# Patient Record
Sex: FEMALE | Race: WHITE | Employment: OTHER | URBAN - METROPOLITAN AREA
[De-identification: names, ages, dates, MRNs, and addresses within clinical notes are randomized per-mention and may not be internally consistent; named-entity substitution may affect disease eponyms.]

---

## 2021-02-15 ENCOUNTER — HOSPITAL ENCOUNTER (EMERGENCY)
Age: 71
Discharge: HOME | End: 2021-02-15
Attending: EMERGENCY MEDICINE | Admitting: EMERGENCY MEDICINE
Payer: MEDICARE

## 2021-02-15 VITALS
WEIGHT: 171.96 LBS | OXYGEN SATURATION: 97 % | HEART RATE: 84 BPM | HEIGHT: 62 IN | SYSTOLIC BLOOD PRESSURE: 124 MMHG | TEMPERATURE: 98.8 F | BODY MASS INDEX: 31.64 KG/M2 | RESPIRATION RATE: 17 BRPM | DIASTOLIC BLOOD PRESSURE: 76 MMHG

## 2021-02-15 DIAGNOSIS — S61.011A THUMB LACERATION, RIGHT, INITIAL ENCOUNTER: Primary | ICD-10-CM

## 2021-02-15 PROCEDURE — 75810000293 HC SIMP/SUPERF WND  RPR

## 2021-02-15 PROCEDURE — 90715 TDAP VACCINE 7 YRS/> IM: CPT | Performed by: PHYSICIAN ASSISTANT

## 2021-02-15 PROCEDURE — 74011250636 HC RX REV CODE- 250/636: Performed by: PHYSICIAN ASSISTANT

## 2021-02-15 PROCEDURE — 99283 EMERGENCY DEPT VISIT LOW MDM: CPT

## 2021-02-15 PROCEDURE — 90471 IMMUNIZATION ADMIN: CPT

## 2021-02-15 PROCEDURE — 74011000250 HC RX REV CODE- 250: Performed by: PHYSICIAN ASSISTANT

## 2021-02-15 RX ORDER — BACITRACIN 500 UNIT/G
1 PACKET (EA) TOPICAL
Status: COMPLETED | OUTPATIENT
Start: 2021-02-15 | End: 2021-02-15

## 2021-02-15 RX ORDER — LIDOCAINE HYDROCHLORIDE 20 MG/ML
10 INJECTION, SOLUTION INFILTRATION; PERINEURAL
Status: COMPLETED | OUTPATIENT
Start: 2021-02-15 | End: 2021-02-15

## 2021-02-15 RX ADMIN — TETANUS TOXOID, REDUCED DIPHTHERIA TOXOID AND ACELLULAR PERTUSSIS VACCINE, ADSORBED 0.5 ML: 5; 2.5; 8; 8; 2.5 SUSPENSION INTRAMUSCULAR at 19:38

## 2021-02-15 RX ADMIN — BACITRACIN 1 PACKET: 500 OINTMENT TOPICAL at 20:00

## 2021-02-15 RX ADMIN — LIDOCAINE HYDROCHLORIDE 200 MG: 20 INJECTION, SOLUTION INFILTRATION; PERINEURAL at 19:38

## 2021-02-15 NOTE — ED TRIAGE NOTES
TRIAGE NOTE: Patient arrived from home with c/o RIGHT thumb laceration. Bleeding controlled in triage. Tetanus up to date.

## 2021-02-16 NOTE — ED PROVIDER NOTES
71 y/o female with no significant pmhx presents to ED due to a laceration to her right thumb which she sustained just prior to arrival. Pt was washing a knife and it slipped in her hand into the R thumb. Able to stop the bleeding with pressure. Denies numbness of her thumb or weakness. Unsure of last tetanus vaccination. History reviewed. No pertinent past medical history. Past Surgical History:   Procedure Laterality Date    HX BREAST REDUCTION      HX CHOLECYSTECTOMY           History reviewed. No pertinent family history.     Social History     Socioeconomic History    Marital status:      Spouse name: Not on file    Number of children: Not on file    Years of education: Not on file    Highest education level: Not on file   Occupational History    Not on file   Social Needs    Financial resource strain: Not on file    Food insecurity     Worry: Not on file     Inability: Not on file    Transportation needs     Medical: Not on file     Non-medical: Not on file   Tobacco Use    Smoking status: Never Smoker    Smokeless tobacco: Never Used   Substance and Sexual Activity    Alcohol use: Never     Frequency: Never    Drug use: Not on file    Sexual activity: Not on file   Lifestyle    Physical activity     Days per week: Not on file     Minutes per session: Not on file    Stress: Not on file   Relationships    Social connections     Talks on phone: Not on file     Gets together: Not on file     Attends Hoahaoism service: Not on file     Active member of club or organization: Not on file     Attends meetings of clubs or organizations: Not on file     Relationship status: Not on file    Intimate partner violence     Fear of current or ex partner: Not on file     Emotionally abused: Not on file     Physically abused: Not on file     Forced sexual activity: Not on file   Other Topics Concern    Not on file   Social History Narrative    Not on file         ALLERGIES: Naproxen    Review of Systems   Respiratory: Negative for cough and shortness of breath. Cardiovascular: Negative for chest pain. Gastrointestinal: Negative for nausea and vomiting. Genitourinary: Negative for dysuria. Musculoskeletal: Negative for myalgias. Skin: Negative for rash. Laceration right thumb   Neurological: Negative for dizziness, weakness and numbness. Vitals:    02/15/21 1759   BP: 124/76   Pulse: 84   Resp: 17   Temp: 98.8 °F (37.1 °C)   SpO2: 97%   Weight: 78 kg (171 lb 15.3 oz)   Height: 5' 2\" (1.575 m)            Physical Exam  Vitals signs and nursing note reviewed. Constitutional:       General: She is not in acute distress. HENT:      Head: Normocephalic. Nose: Nose normal.      Mouth/Throat:      Mouth: Mucous membranes are moist.   Eyes:      Extraocular Movements: Extraocular movements intact. Neck:      Musculoskeletal: Normal range of motion. Pulmonary:      Effort: Pulmonary effort is normal. No respiratory distress. Musculoskeletal:        Hands:       Comments: Capillary refill brisk, normal sensation and strength   Skin:     General: Skin is dry. Findings: No rash. Neurological:      Mental Status: She is alert and oriented to person, place, and time. Psychiatric:         Mood and Affect: Mood normal.          MDM  Number of Diagnoses or Management Options  Thumb laceration, right, initial encounter  Diagnosis management comments: Differential diagnosis includes foreign body, bony involvement, nail injury, neurovascular injury, and others    Thumb is neurovascularly intact. There is no foreign body. Cleaned thoroughly with Betadine and closed with good approximation. There is no nail involvement. Discussed appropriate follow-up with primary care for suture removal. Return precautions reviewed.               Wound Repair    Date/Time: 2/15/2021 8:47 PM  Performed by: PAPreparation: skin prepped with Betadine  Location details: right thumb  Wound length:2.5 cm or less (1.5)  Anesthesia: local infiltration    Anesthesia:  Local Anesthetic: lidocaine 2% without epinephrine  Anesthetic total: 3 mL  Foreign bodies: no foreign bodies  Irrigation solution: saline  Irrigation method: syringe  Debridement: none  Wound skin closure material used: 4-0 ethilon. Number of sutures: 3  Technique: simple  Approximation: close  Dressing: antibiotic ointment  Patient tolerance: patient tolerated the procedure well with no immediate complications  My total time at bedside, performing this procedure was 16-30 minutes (20).             Nancy Mukherjee PA-C  2/15/2021

## 2021-02-16 NOTE — ED NOTES
Bacitracin applied to wound to right thumb. Covered with a non-stick dressing and wrapped with tube gauze. Patient verbalizes understanding of wound care. Denies any questions or concerns at this time.

## 2021-02-16 NOTE — ED NOTES

## 2024-03-11 ENCOUNTER — OFFICE VISIT (OUTPATIENT)
Age: 74
End: 2024-03-11

## 2024-03-11 VITALS
SYSTOLIC BLOOD PRESSURE: 144 MMHG | TEMPERATURE: 97.6 F | BODY MASS INDEX: 30.41 KG/M2 | OXYGEN SATURATION: 98 % | WEIGHT: 171.6 LBS | RESPIRATION RATE: 18 BRPM | DIASTOLIC BLOOD PRESSURE: 79 MMHG | HEART RATE: 77 BPM | HEIGHT: 63 IN

## 2024-03-11 DIAGNOSIS — M79.5 FOREIGN BODY (FB) IN SOFT TISSUE: Primary | ICD-10-CM

## 2024-03-11 RX ORDER — LORATADINE 10 MG/1
10 CAPSULE, LIQUID FILLED ORAL DAILY
COMMUNITY

## 2024-03-11 RX ORDER — CEPHALEXIN 500 MG/1
500 CAPSULE ORAL 3 TIMES DAILY
Qty: 21 CAPSULE | Refills: 0 | Status: SHIPPED | OUTPATIENT
Start: 2024-03-11 | End: 2024-03-18

## 2024-03-11 ASSESSMENT — ENCOUNTER SYMPTOMS: SHORTNESS OF BREATH: 0

## 2024-03-11 NOTE — PATIENT INSTRUCTIONS
You received a digital block to numb your finger. Be very careful you do not hit your finger or expose to heat while it is still numb. Inspect the injection site to ensure it does not turn red. Otherwise, take pain reliever before the lidocaine wears completely off. Monitor for signs and symptoms of infection . I have prescribed Keflex to cover any possible infection.     I have discussed any testing results, diagnosis and treatment plan. If symptoms worsen please present to your local ED for urgent matters. Otherwise, please follow up with your PCP. Thank you for seeing us today at [facility].  I hope you feel better soon.

## 2024-03-11 NOTE — PROGRESS NOTES
down the nail.  Review of Systems    Review of Systems   Constitutional:  Negative for fever.   HENT: Negative.     Respiratory:  Negative for shortness of breath.    Cardiovascular:  Negative for chest pain.        Allergies   Allergen Reactions    Naproxen Hives       No past medical history on file.    Past Surgical History:   Procedure Laterality Date    BREAST REDUCTION SURGERY      CHOLECYSTECTOMY        Objective     Vitals:    03/11/24 1037   BP: (!) 144/79   Site: Right Upper Arm   Position: Sitting   Cuff Size: Medium Adult   Pulse: 77   Resp: 18   Temp: 97.6 °F (36.4 °C)   SpO2: 98%   Weight: 77.8 kg (171 lb 9.6 oz)   Height: 1.6 m (5' 3\")     Physical Exam  Musculoskeletal:      Right hand: Tenderness present. Normal sensation. Normal pulse.      Left hand: Normal.      Comments: Very small piece of wood under nail of right ring finger. Located in mid nail.         No results found for any visits on 03/11/24.  The patients condition was discussed with the patient and they understand.  The patient is to follow up with primary care doctor.  If signs and symptoms become worse the pt is to go to the ER. The patient is to take medications as prescribed.     An electronic signature was used to authenticate this note.  Augusta Frias PA-C

## 2024-03-22 ENCOUNTER — OFFICE VISIT (OUTPATIENT)
Age: 74
End: 2024-03-22

## 2024-03-22 VITALS
OXYGEN SATURATION: 97 % | RESPIRATION RATE: 18 BRPM | DIASTOLIC BLOOD PRESSURE: 71 MMHG | BODY MASS INDEX: 31.1 KG/M2 | WEIGHT: 169 LBS | HEIGHT: 62 IN | SYSTOLIC BLOOD PRESSURE: 128 MMHG | HEART RATE: 73 BPM | TEMPERATURE: 98.6 F

## 2024-03-22 DIAGNOSIS — H01.001 BLEPHARITIS OF RIGHT UPPER EYELID, UNSPECIFIED TYPE: Primary | ICD-10-CM

## 2024-03-22 RX ORDER — ERYTHROMYCIN 5 MG/G
OINTMENT OPHTHALMIC EVERY 6 HOURS
Qty: 1 G | Refills: 0 | Status: SHIPPED | OUTPATIENT
Start: 2024-03-22 | End: 2024-03-29

## 2024-03-22 RX ORDER — CHOLECALCIFEROL (VITAMIN D3) 1250 MCG
50000 CAPSULE ORAL DAILY
COMMUNITY

## 2024-03-22 ASSESSMENT — ENCOUNTER SYMPTOMS
PHOTOPHOBIA: 0
EYE ITCHING: 0
EYE DISCHARGE: 1
EYE REDNESS: 1

## 2024-03-22 NOTE — PATIENT INSTRUCTIONS
Please follow up with your PCP in 2-5 days.    Go to the ER for worsening or persistent symptoms, changes in vision, severe headache with nausea, or pain with eye movement.    Good lid hygiene is the mainstay of treatment for all forms of blepharitis.    -Eliminate or limit potential triggers or exacerbating factors (eg, allergens, cigarette smoking).  Contact lenses may continue to be worn if comfortable.  -The management of contact (allergic) blepharitis consists of eliminating use of the offending agent (eg, cosmetics).  Be vigilant about removing makeup at night, cleaning applicators, and avoiding old or  products.    Mild to moderate symptoms:    -warm compresses, lid massage, lid washing and artificial tears  Put warm wet pressure on your eyes - wet a clean wash cloth with warm (not scalding hot) water and put it over your eyes.  When the wash cloth cools, reheat it with warm water and put it back over your eyes.  Repeat these steps for 5 minutes, 2 to 4 times a day.  Gently rub your eyelids - Do this right after putting warm, wet pressure on your eyes.  Use the washcloth or a clean fingertip to gently rub your eyelid in small circles.  Wash your eyelids - Use plain warm water or warm water with a drop of baby shampoo on a clean washcloth, gauze pad, or cotton swab.  Gently clean any crusty material off the eyelashes and eyelids.  Do not rub hard or you can cause more irritation.  You can also use over-the-counter eyelid scrubs and pads  Refresh Eye Drops, preferably gel for relief.

## 2024-03-22 NOTE — PROGRESS NOTES
Subjective     Chief Complaint   Patient presents with    Eye Pain     right eye lid swollen and sore with clear discharge, got top soil in her eye          Patient ID:  Raquel Hamilton is a 73 y.o. female.    Patient is 73 year old female presenting with swelling and drainage of right eye.  She reports symptoms began yesterday while gardening.  She thinks she may have gotten some topsoil in her eye.  She denies any visual changes or contact lenses.          Review of Systems   Eyes:  Positive for discharge and redness. Negative for photophobia, pain, itching and visual disturbance.       History reviewed. No pertinent past medical history.    Past Surgical History:   Procedure Laterality Date    BREAST REDUCTION SURGERY      CHOLECYSTECTOMY         History reviewed. No pertinent family history.    Allergies   Allergen Reactions    Naproxen Hives       Social History     Tobacco Use    Smoking status: Never    Smokeless tobacco: Never   Substance Use Topics    Alcohol use: Never    Drug use: Never       Objective   Vitals:    03/22/24 0841   BP: 128/71   Pulse: 73   Resp: 18   Temp: 98.6 °F (37 °C)   SpO2: 97%     Physical Exam  Constitutional:       General: She is not in acute distress.     Appearance: Normal appearance. She is not ill-appearing.   HENT:      Head: Normocephalic and atraumatic.   Eyes:      Extraocular Movements: Extraocular movements intact.      Conjunctiva/sclera: Conjunctivae normal.      Pupils: Pupils are equal, round, and reactive to light.      Right eye: No fluorescein uptake.     Cardiovascular:      Rate and Rhythm: Normal rate.      Pulses: Normal pulses.   Pulmonary:      Effort: Pulmonary effort is normal.   Skin:     General: Skin is warm and dry.   Neurological:      Mental Status: She is alert and oriented to person, place, and time.         Assessment & Plan     Diagnoses and all orders for this visit:  Blepharitis of right upper eyelid, unspecified type  Other orders  -

## 2024-05-31 ENCOUNTER — OFFICE VISIT (OUTPATIENT)
Age: 74
End: 2024-05-31

## 2024-05-31 VITALS
BODY MASS INDEX: 31.54 KG/M2 | SYSTOLIC BLOOD PRESSURE: 134 MMHG | RESPIRATION RATE: 18 BRPM | HEIGHT: 62 IN | WEIGHT: 171.4 LBS | DIASTOLIC BLOOD PRESSURE: 76 MMHG | TEMPERATURE: 98.4 F | OXYGEN SATURATION: 97 % | HEART RATE: 64 BPM
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
## 2024-05-31 RX ORDER — TRIAMCINOLONE ACETONIDE 1 MG/G
OINTMENT TOPICAL 2 TIMES DAILY
Qty: 15 G | Refills: 0 | Status: SHIPPED | OUTPATIENT
Start: 2024-05-31 | End: 2024-06-07

## 2024-05-31 ASSESSMENT — ENCOUNTER SYMPTOMS
VOMITING: 0
NAUSEA: 0
SHORTNESS OF BREATH: 0
COUGH: 0

## 2024-05-31 NOTE — PROGRESS NOTES
Subjective     Chief Complaint   Patient presents with    Other     Pt presents an insect bite on left shoulder on Monday, now red, swollen, painful with heat to the touch w/o limitations on moving arm.        HPI 73-year-old female presents for a bug bite on her left shoulder going on for the past 3 to 4 days.  She noticed some localized redness.  She has been trying various treatments including erythromycin topically hydrocortisone topically, poison ivy medicine.  There has been no fever chills nausea vomiting difficulty breathing.    History reviewed. No pertinent past medical history.    Past Surgical History:   Procedure Laterality Date    BREAST REDUCTION SURGERY      CHOLECYSTECTOMY         History reviewed. No pertinent family history.    Allergies   Allergen Reactions    Naproxen Hives       Social History     Tobacco Use    Smoking status: Never    Smokeless tobacco: Never   Substance Use Topics    Alcohol use: Never    Drug use: Never       Vitals:    05/31/24 0903   BP: 134/76   Pulse: 64   Resp: 18   Temp: 98.4 °F (36.9 °C)   SpO2: 97%       Review of Systems   Constitutional:  Negative for chills and fever.   HENT:  Negative for congestion.    Respiratory:  Negative for cough and shortness of breath.    Gastrointestinal:  Negative for nausea and vomiting.       Objective     Physical Exam  Vitals reviewed.   Constitutional:       Appearance: Normal appearance.   Cardiovascular:      Rate and Rhythm: Normal rate and regular rhythm.      Heart sounds: Normal heart sounds.   Pulmonary:      Effort: Pulmonary effort is normal.      Breath sounds: Normal breath sounds.   Skin:     Comments: Left shoulder exam shows a 6 bug bite with some surrounding very faint redness with no red streaking erythema or discharge.  Classic for a simple bug bite with histamine response.   Neurological:      General: No focal deficit present.      Mental Status: She is alert and oriented to person, place, and time.

## 2024-05-31 NOTE — PATIENT INSTRUCTIONS
Patient to use some over-the-counter antihistamine such as Zyrtec and will call in topical steroid.  Follow-up as needed.

## 2024-10-10 ENCOUNTER — OFFICE VISIT (OUTPATIENT)
Age: 74
End: 2024-10-10

## 2024-10-10 VITALS
TEMPERATURE: 98.1 F | WEIGHT: 173.2 LBS | RESPIRATION RATE: 18 BRPM | HEIGHT: 62 IN | SYSTOLIC BLOOD PRESSURE: 135 MMHG | DIASTOLIC BLOOD PRESSURE: 82 MMHG | BODY MASS INDEX: 31.87 KG/M2 | OXYGEN SATURATION: 96 % | HEART RATE: 73 BPM

## 2024-10-10 DIAGNOSIS — R03.0 ELEVATED BLOOD PRESSURE READING: ICD-10-CM
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
## 2024-10-10 DIAGNOSIS — L03.818 CELLULITIS OF OTHER SPECIFIED SITE: Primary | ICD-10-CM

## 2024-10-10 DIAGNOSIS — S30.860A INSECT BITE OF PELVIC REGION, INITIAL ENCOUNTER: ICD-10-CM

## 2024-10-10 RX ORDER — DOXYCYCLINE HYCLATE 100 MG
100 TABLET ORAL 2 TIMES DAILY
Qty: 20 TABLET | Refills: 0 | Status: SHIPPED | OUTPATIENT
Start: 2024-10-10 | End: 2024-10-20

## 2024-10-10 ASSESSMENT — ENCOUNTER SYMPTOMS
RESPIRATORY NEGATIVE: 1
COLOR CHANGE: 1
EYES NEGATIVE: 1
GASTROINTESTINAL NEGATIVE: 1

## 2024-10-10 NOTE — PATIENT INSTRUCTIONS
1 ½ ounces of cheese.  Eat 7 to 8 servings of grains each day. A serving is 1 slice of bread, 1 ounce of dry cereal, or ½ cup of cooked rice, pasta, or cooked cereal. Try to choose whole-grain products as much as possible.  Limit lean meat, poultry, and fish to 6 ounces each day. Six ounces is about the size of two decks of cards.  Eat 4 to 5 servings of nuts, seeds, and legumes (cooked dried beans, lentils, and split peas) each week. A serving is 1/3 cup of nuts, 2 tablespoons of seeds, or ½ cup cooked dried beans or peas.  Limit sweets and added sugars to 5 servings or less a week. A serving is 1 tablespoon jelly or jam, ½ cup sorbet, or 1 cup of lemonade.  Tips for success  Start small. Do not try to make dramatic changes to your diet all at once. You might feel that you are missing out on your favorite foods and then be more likely to not follow the plan. Make small changes, and stick with them. Once those changes become habit, add a few more changes.  Try some of the following:  Make it a goal to eat a fruit or vegetable at every meal and at snacks. This will make it easy to get the recommended amount of fruits and vegetables each day.  Try yogurt topped with fruit and nuts for a snack or healthy dessert.  Add lettuce, tomato, cucumber, and onion to sandwiches.  Combine a ready-made pizza crust with low-fat mozzarella cheese and lots of vegetable toppings. Try using tomatoes, squash, spinach, broccoli, carrots, cauliflower, and onions.  Have a variety of cut-up vegetables with a low-fat dip as an appetizer instead of chips and dip.  Sprinkle sunflower seeds or chopped almonds over salads. Or try adding chopped walnuts or almonds to cooked vegetables.  Try some vegetarian meals using beans and peas. Add garbanzo or kidney beans to salads. Make burritos and tacos with mashed hebert beans or black beans    © 7628-9725 [employer]. Care instructions adapted under license by [facility].

## 2024-10-10 NOTE — PROGRESS NOTES
10/10/24.      Review of Systems   Constitutional: Negative.    HENT: Negative.     Eyes: Negative.    Respiratory: Negative.     Cardiovascular: Negative.    Gastrointestinal: Negative.    Genitourinary: Negative.    Musculoskeletal: Negative.    Skin:  Positive for color change (red area with itching and pain to middle pubic area).   Neurological: Negative.    Hematological: Negative.    Psychiatric/Behavioral: Negative.           Physical Exam  Vitals and nursing note reviewed.   Constitutional:       Appearance: Normal appearance.   HENT:      Head: Normocephalic.   Cardiovascular:      Rate and Rhythm: Normal rate and regular rhythm.      Pulses: Normal pulses.      Heart sounds: Normal heart sounds.   Pulmonary:      Effort: Pulmonary effort is normal.      Breath sounds: Normal breath sounds.   Abdominal:      General: Abdomen is flat. Bowel sounds are normal.      Palpations: Abdomen is soft.   Musculoskeletal:         General: Normal range of motion.   Skin:     Findings: Erythema present.             Comments: An area the size of 6cm x 5cm erythematous and warm to the touch; bite site noted in center of site; no drainage noted   Neurological:      General: No focal deficit present.      Mental Status: She is alert.   Psychiatric:         Mood and Affect: Mood normal.         Behavior: Behavior normal.         Thought Content: Thought content normal.        Vitals:    10/10/24 0855 10/10/24 0924   BP: (!) 143/83 135/82   Site: Right Upper Arm Left Upper Arm   Position: Sitting Sitting   Cuff Size: Medium Adult Medium Adult   Pulse: 73    Resp: 18    Temp: 98.1 °F (36.7 °C)    TempSrc: Oral    SpO2: 96%    Weight: 78.6 kg (173 lb 3.2 oz)    Height: 1.575 m (5' 2\")         Allergies   Allergen Reactions    Naproxen Hives       Current Outpatient Medications   Medication Sig Dispense Refill    doxycycline hyclate (VIBRA-TABS) 100 MG tablet Take 1 tablet by mouth 2 times daily for 10 days 20 tablet 0    vitamin

## 2024-11-03 ENCOUNTER — OFFICE VISIT (OUTPATIENT)
Age: 74
End: 2024-11-03

## 2024-11-03 VITALS
TEMPERATURE: 98.3 F | BODY MASS INDEX: 32.74 KG/M2 | SYSTOLIC BLOOD PRESSURE: 145 MMHG | OXYGEN SATURATION: 96 % | DIASTOLIC BLOOD PRESSURE: 80 MMHG | WEIGHT: 179 LBS | HEART RATE: 72 BPM

## 2024-11-03 DIAGNOSIS — R03.0 ELEVATED BLOOD PRESSURE READING: ICD-10-CM

## 2024-11-03 DIAGNOSIS — H00.015 HORDEOLUM OF LEFT LOWER EYELID, UNSPECIFIED HORDEOLUM TYPE: Primary | ICD-10-CM

## 2024-11-03 DIAGNOSIS — L03.213 PERIORBITAL CELLULITIS OF RIGHT EYE: ICD-10-CM

## 2024-11-03 RX ORDER — ERYTHROMYCIN 5 MG/G
OINTMENT OPHTHALMIC EVERY 6 HOURS
Qty: 3.5 G | Refills: 0 | Status: SHIPPED | OUTPATIENT
Start: 2024-11-03

## 2024-11-03 RX ORDER — SULFAMETHOXAZOLE AND TRIMETHOPRIM 800; 160 MG/1; MG/1
1 TABLET ORAL 2 TIMES DAILY
Qty: 10 TABLET | Refills: 0 | Status: SHIPPED | OUTPATIENT
Start: 2024-11-03 | End: 2024-11-08

## 2025-04-28 ENCOUNTER — OFFICE VISIT (OUTPATIENT)
Age: 75
End: 2025-04-28

## 2025-04-28 VITALS
RESPIRATION RATE: 16 BRPM | TEMPERATURE: 97.7 F | HEART RATE: 70 BPM | OXYGEN SATURATION: 99 % | BODY MASS INDEX: 32.37 KG/M2 | DIASTOLIC BLOOD PRESSURE: 83 MMHG | SYSTOLIC BLOOD PRESSURE: 137 MMHG | WEIGHT: 177 LBS

## 2025-04-28 DIAGNOSIS — S70.262A INSECT BITE OF LEFT HIP, INITIAL ENCOUNTER: Primary | ICD-10-CM
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
## 2025-04-28 ASSESSMENT — ENCOUNTER SYMPTOMS
NAUSEA: 0
VOMITING: 0
COUGH: 0
SHORTNESS OF BREATH: 0
DIARRHEA: 0
SORE THROAT: 0

## 2025-04-28 NOTE — PATIENT INSTRUCTIONS
Patient will stay on her antihistamine, recommend topical over-the-counter cortisone zone and follow-up as needed

## 2025-04-28 NOTE — PROGRESS NOTES
Subjective     Chief Complaint   Patient presents with    Insect Bite     Insect bite to LT buttock area, onset 1.5 week. Redness and itchiness.     Cellulitis     Reports of redness and circular shaped surrounding the area of the insect bite.          Insect Bite  Associated symptoms: no congestion, no cough, no diarrhea, no fever, no nausea, no shortness of breath, no sore throat and no vomiting     74-year-old female presents for an insect bite on her left buttocks region going on for approximately 1 week.  Not accompanied any fever chills nausea vomiting.  It has been itching her.  She noticed some redness to the area.    History reviewed. No pertinent past medical history.    Past Surgical History:   Procedure Laterality Date    BREAST REDUCTION SURGERY      CHOLECYSTECTOMY         History reviewed. No pertinent family history.    Allergies   Allergen Reactions    Naproxen Hives    Aspirin Other (See Comments)    Cefprozil Other (See Comments)    Codeine Nausea And Vomiting and Other (See Comments)     Reaction Type: Allergy       Social History     Tobacco Use    Smoking status: Never    Smokeless tobacco: Never   Vaping Use    Vaping status: Never Used   Substance Use Topics    Alcohol use: Never    Drug use: Never       Vitals:    04/28/25 1039   BP: 137/83   Pulse:    Resp:    Temp:    SpO2:        Objective     Review of Systems   Constitutional:  Negative for chills and fever.   HENT:  Negative for congestion and sore throat.    Respiratory:  Negative for cough and shortness of breath.    Gastrointestinal:  Negative for diarrhea, nausea and vomiting.       Physical Exam  Vitals reviewed.   Constitutional:       Appearance: Normal appearance.   Skin:     Comments: Left buttocks region examined there is an insect bite some redness localized no red streaking no tenderness no discharge is consistent with a histamine response.   Neurological:      General: No focal deficit present.      Mental Status: She is